# Patient Record
Sex: MALE | Race: BLACK OR AFRICAN AMERICAN | NOT HISPANIC OR LATINO | Employment: STUDENT | URBAN - METROPOLITAN AREA
[De-identification: names, ages, dates, MRNs, and addresses within clinical notes are randomized per-mention and may not be internally consistent; named-entity substitution may affect disease eponyms.]

---

## 2017-01-03 ENCOUNTER — TELEPHONE (OUTPATIENT)
Dept: PEDIATRIC CARDIOLOGY | Facility: CLINIC | Age: 22
End: 2017-01-03

## 2017-01-03 NOTE — TELEPHONE ENCOUNTER
Will review with CC before returning pt's call.    ----- Message from Marylou Noble sent at 1/3/2017  4:29 PM CST -----  Patient calling - he is doing a 21 day fast with his Pentec[employer]alism.  On Tuesday and Thursday they only drink water - no food.  His pelayo wanted him to check with Dr Neal to be sure this was ok before he did it.  He also works out almost everyday.    Patient phone# [phone]    Marylou

## 2017-01-04 ENCOUNTER — TELEPHONE (OUTPATIENT)
Dept: PEDIATRIC CARDIOLOGY | Facility: CLINIC | Age: 22
End: 2017-01-04

## 2017-01-04 NOTE — TELEPHONE ENCOUNTER
Tried returning Sterling's call to give him below info.  No answer and VM box not set up.    ----- Message from CELINA Banuelos,FNP-C sent at 1/3/2017  6:01 PM CST -----  Reviewed with Dr. Neal. Ok to fast, but if ssx of dizziness, he should eat. Also he may benefit from drinking gatorade during the fast days instead of just water.   CC  ----- Message -----     From: Amanda Pool RN     Sent: 1/3/2017   4:51 PM       To: CELINA Banuelos,RAMON-C    Let me know what you think, and I'll give him a call back.    -CR    ----- Message -----     From: Marylou Noble     Sent: 1/3/2017   4:29 PM       To: King [facility]ren Staff    Patient calling - he is doing a 21 day fast with his Judaism.  On Tuesday and Thursday they only drink water - no food.  His pelayo wanted him to check with Dr Neal to be sure this was ok before he did it.  He also works out almost everyday.    Patient phone# [phone]    Marylou

## 2017-01-05 ENCOUNTER — TELEPHONE (OUTPATIENT)
Dept: PEDIATRIC CARDIOLOGY | Facility: CLINIC | Age: 22
End: 2017-01-05

## 2017-01-05 NOTE — TELEPHONE ENCOUNTER
"Heart began fluttering then stopped.  In the middle of walking on the treadmill at gym when it started.  Lasted "not even a minute."  Then it began "beating really heart."  That lasted about 3-5 min.  Took some deep breaths and it stopped.   Severe migraines above left eye.  Last as long as 4 hours.  Right hand and right foot have been going numb as well.  He denies any neuro history or ever having seen a neurologist.  He denies CP or SOB.    Rev'd with CC.  Call us if this happens again, and we will have him come in to have a holter put on.  Important to go about normal workout routine while wearing the holter.  Afterwards, if nothing significant is caught on the monitor, we will get him in with Dr. De Guzman.    Called Sterling back to discuss above instructions.  Instructed him also to see PCP regarding migraines and extremity numbness.  Nothing notes in history/echo/holter to indicate this could be heart-related.  Told him to call back with any updates or questions in the meantime.  Sterling verbalized understanding.  "

## 2017-01-05 NOTE — TELEPHONE ENCOUNTER
----- Message from Qamar De Guzman MD sent at 1/5/2017 10:04 AM CST -----  Contact: Self [phone]  fine to wait  ----- Message -----     From: Chery Ortiz RN     Sent: 1/5/2017   8:58 AM       To: Qamar De Guzman MD    Ok to wait until march? Or squeeze in in January?      ----- Message -----     From: Marco Zambrano     Sent: 1/5/2017   8:27 AM       To: Gab BROWN Staff    Patient would like to be seen at the [place] location with Dr. De Guzman. There are no available appointments to schedule for January at the [place] location with Dr. De Guzman.  Patient states he was advised to return to clinic 3 months after last office visit. Patient is requesting a return phone call.

## 2018-10-10 DIAGNOSIS — R00.2 PALPITATIONS: Primary | ICD-10-CM

## 2018-10-10 DIAGNOSIS — I44.0 FIRST DEGREE HEART BLOCK: ICD-10-CM

## 2018-12-03 ENCOUNTER — CLINICAL SUPPORT (OUTPATIENT)
Dept: PEDIATRIC CARDIOLOGY | Facility: CLINIC | Age: 23
End: 2018-12-03
Attending: NURSE PRACTITIONER
Payer: COMMERCIAL

## 2018-12-03 ENCOUNTER — OFFICE VISIT (OUTPATIENT)
Dept: PEDIATRIC CARDIOLOGY | Facility: CLINIC | Age: 23
End: 2018-12-03
Payer: COMMERCIAL

## 2018-12-03 VITALS
HEIGHT: 71 IN | BODY MASS INDEX: 28.36 KG/M2 | DIASTOLIC BLOOD PRESSURE: 82 MMHG | OXYGEN SATURATION: 100 % | WEIGHT: 202.56 LBS | SYSTOLIC BLOOD PRESSURE: 120 MMHG | HEART RATE: 54 BPM | RESPIRATION RATE: 16 BRPM

## 2018-12-03 DIAGNOSIS — R00.2 PALPITATIONS: ICD-10-CM

## 2018-12-03 DIAGNOSIS — I44.0 FIRST DEGREE HEART BLOCK: ICD-10-CM

## 2018-12-03 DIAGNOSIS — B20 HIV (HUMAN IMMUNODEFICIENCY VIRUS INFECTION): ICD-10-CM

## 2018-12-03 DIAGNOSIS — I51.7 MILD CONCENTRIC LEFT VENTRICULAR HYPERTROPHY (LVH): ICD-10-CM

## 2018-12-03 DIAGNOSIS — R07.9 CHEST PAIN, UNSPECIFIED TYPE: ICD-10-CM

## 2018-12-03 DIAGNOSIS — R55 BLACK-OUT (NOT AMNESIA): ICD-10-CM

## 2018-12-03 PROCEDURE — 3008F BODY MASS INDEX DOCD: CPT | Mod: CPTII,S$GLB,, | Performed by: NURSE PRACTITIONER

## 2018-12-03 PROCEDURE — 99214 OFFICE O/P EST MOD 30 MIN: CPT | Mod: S$GLB,,, | Performed by: NURSE PRACTITIONER

## 2018-12-03 RX ORDER — EMTRICITABINE, RILPIVIRINE HYDROCHLORIDE, AND TENOFOVIR ALAFENAMIDE 200; 25; 25 MG/1; MG/1; MG/1
TABLET ORAL
COMMUNITY
Start: 2018-11-28

## 2018-12-03 NOTE — PATIENT INSTRUCTIONS
Aren Neal MD  Pediatric Cardiology  [address] [place] [address]  [phone]    General Guidelines    Name: Sterling Lopes                   : 1995    Diagnosis:   1. Mild concentric left ventricular hypertrophy (LVH)    2. Palpitations    3. First degree heart block    4. HIV (human immunodeficiency virus infection)    5. Chest pain, unspecified type        PCP: RAMON London  PCP Phone Number: [phone]    · If you have an emergency or you think you have an emergency, go to the nearest emergency room!     · Breathing too fast, doesnt look right, consistently not eating well, your child needs to be checked. These are general indications that your child is not feeling well. This may be caused by anything, a stomach virus, an ear ache or heart disease, so please call RAMON London. If RAMON London thinks you need to be checked for your heart, they will let us know.     · If your child experiences a rapid or very slow heart rate and has the following symptoms, call RAMON London or go to the nearest emergency room.   · unexplained chest pain   · does not look right   · feels like they are going to pass out   · actually passes out for unexplained reasons   · weakness or fatigue   · shortness of breath  or breathing fast   · consistent poor feeding     · If your child experiences a rapid or very slow heart rate that lasts longer than 30 minutes call RAMON London or go to the nearest emergency room.     · If your child feels like they are going to pass out - have them sit down or lay down immediately. Raise the feet above the head (prop the feet on a chair or the wall) until the feeling passes. Slowly allow the child to sit, then stand. If the feeling returns, lay back down and start over.     It is very important that you notify RAMON London first. RAMON London or the ER Physician can reach Dr. Aren Neal at the office or through [facility] at [phone] as needed.    Call our office ([phone]) one week after ALL tests for results.

## 2018-12-03 NOTE — PROGRESS NOTES
"[facility]  Sterling Lopes  1995        HPI  Sterling Lopes has a past medical history of first degree heart block, mild LVH, chest pain, palpitations, dizziness, syncope, HIV (diagnosed last year), and family history of cerebral and aortic aneurysms and family history of cardiomegaly her today again with complaints of sharp chest pain at rest with palpitations and black out spells.  He was originally seen in 2010 for syncopal episode as well as again in 2011 for another syncopal episode after basketball and football games.  It was felt at that time that he was suffering from orthostatic hypotension and was instructed to increase fluid intake.  He also had a EKG at that time that showed a borderline first-degree heart block.  His last visit was in September 2016 where he had complaints of heart racing describes as a flutter and then felt like it slowed down to a very slow heart rate.  He states that his rate would suddenly change.  He was also having some dizzy spells but no syncope. He had an extensive work up including 30 day event monitor without significant abnormalities. He also had a treadmill stress test with CE post which was overall WNL with exception of elevated BP.   Since last visit, Sterling reports that he has had symptoms of sharp chest pain and palpitations at rest. Additionally, he has had episodes where he was on the phone with his friends and he "blacks out" for up to 3 minutes at a time. When he comes to, he is disoriented and confused for a short time and then is okay. He states he will also notice a sharp pain in her left chest that travels down his arm and he holds his chest and takes a deep breath. He will notice his heart racing at those times as well. At times, he also notices that his heart rate seems to slow down to a very slow rate where he almost feels sluggish (he has had similar complaints on previous visits). States he went to [facility] in [place] and " they did an EKG and some labs and discharged him home. Sterling exercises most days of the weak. His exercises consist of running/walking on treadmill with incline at 15 at 4.8mph and intermittently runs for 1-3 min intervals for anywhere from  30-60min at least 3-4 days a week. On other days, he lifts weights which includes bench presses up to 265 lbs and squats over 300 lbs. Sterling is a pescatarian (eats seafood and vegetables and eats no meat). He takes a daily vitamin and denies any workout/enhancement supplements. Denies any recent illness, surgeries, or hospitalizations. Sterling does state that he was found to be positive for HIV last year and is now on triple antiviral therapy. He is managed at [facility] and states his viral load is undetectable.    Past Medical History:   Diagnosis Date    Dizziness     Family history of aneurysm     MGF: abdominal aorta; Maternal uncle: cerebral; Maternal great-aunt: below brain stem    Family history of cardiomegaly     Maternal grandmother's heart is enlarged in the lower part    First degree heart block     borderline    HIV (human immunodeficiency virus infection)     Mild concentric left ventricular hypertrophy (LVH)     Palpitations     heart fluttering     Family History   Problem Relation Age of Onset    Arrhythmia Mother         is on beta blocker    Hypertension Mother     Heart failure Maternal Grandfather      Social History     Socioeconomic History    Marital status: Single     Spouse name: Not on file    Number of children: Not on file    Years of education: Not on file    Highest education level: Not on file   Social Needs    Financial resource strain: Not on file    Food insecurity - worry: Not on file    Food insecurity - inability: Not on file    Transportation needs - medical: Not on file    Transportation needs - non-medical: Not on file   Occupational History    Not on file   Tobacco Use    Smoking status: Not on file   Substance and  Sexual Activity    Alcohol use: Not on file    Drug use: Not on file    Sexual activity: Not on file   Other Topics Concern    Not on file   Social History Narrative    He is studying [employer] at [employer]     Past Surgical History:   Procedure Laterality Date    NO PAST SURGERIES       Birth History    Birth     Weight: 3.6 kg (7 lb 15 oz)       There is no immunization history on file for this patient.  Immunizations were reviewed today and if not current, recommend follow up with the PCP for further management.  Past medical history, family history, surgical history, social history updated and reviewed today.     Allergies: Review of patient's allergies indicates:  No Known Allergies  Current Medications:   Current Outpatient Medications on File Prior to Visit   Medication Sig Dispense Refill    ODEFSEY 200-25-25 mg Tab        No current facility-administered medications on file prior to visit.        Review of Systems   Constitution: Negative for decreased appetite, diaphoresis, fever, weakness, malaise/fatigue, night sweats, weight gain and weight loss.   HENT: Negative for congestion, ear pain, hoarse voice, stridor and tinnitus.    Eyes: Negative for blurred vision, double vision, pain, photophobia, visual disturbance and visual halos.   Cardiovascular: Positive for chest pain and palpitations. Negative for claudication, cyanosis, dyspnea on exertion and near-syncope.        See HPI for description   Respiratory: Negative for cough, shortness of breath, sleep disturbances due to breathing and wheezing.    Endocrine: Negative for cold intolerance, heat intolerance, polydipsia, polyphagia and polyuria.   Hematologic/Lymphatic: Negative for bleeding problem. Does not bruise/bleed easily.   Skin: Negative for color change, flushing, nail changes, rash and suspicious lesions.   Musculoskeletal: Negative for joint pain, joint swelling, muscle cramps, muscle weakness, myalgias, neck pain and  "stiffness.   Gastrointestinal: Negative for bloating, abdominal pain, anorexia, change in bowel habit, constipation, diarrhea, dysphagia, melena, nausea and vomiting.   Genitourinary: Negative for dysuria, flank pain, frequency, nocturia and urgency.   Neurological: Negative for difficulty with concentration, dizziness, focal weakness, headaches, light-headedness, seizures and tremors.        Black out spells/ LOC as described above.   Psychiatric/Behavioral: Negative for memory loss and substance abuse. The patient does not have insomnia.    Allergic/Immunologic: Negative for environmental allergies, hives and persistent infections.       Objective:   Vitals:    12/03/18 1450   BP: 120/82   BP Location: Right arm   Patient Position: Lying   BP Method: Large (Manual)   Pulse: (!) 54   Resp: 16   SpO2: 100%   Weight: 91.9 kg (202 lb 9 oz)   Height: 5' 10.79" (1.798 m)       Physical Exam   Constitutional: Vital signs are normal. He appears well-developed and well-nourished. He is active. He does not appear ill. No distress.   HENT:   Head: Normocephalic and atraumatic.   Nose: Nose normal.   Mouth/Throat: Mucous membranes are not pale, not dry and not cyanotic.   Eyes: Conjunctivae, EOM and lids are normal. Pupils are equal, round, and reactive to light. No scleral icterus.   Neck: Neck supple. Normal carotid pulses and no JVD present. Carotid bruit is not present. No thyroid mass and no thyromegaly present.   Cardiovascular: Normal rate and regular rhythm.  No extrasystoles are present. Exam reveals no gallop, no S3 and no S4.   No murmur heard.  Pulses:       Radial pulses are 2+ on the right side, and 2+ on the left side.   Quiet precordium, regular rate and rhythm, single S1, split S2, normal P2.   No clicks or rumbles.   No cardiomegaly by palpation.      Pulmonary/Chest: Effort normal and breath sounds normal. No respiratory distress. He exhibits no mass and no deformity.   Abdominal: Soft. Normal appearance " and bowel sounds are normal. There is no hepatosplenomegaly. There is no tenderness. No hernia.   Musculoskeletal: Normal range of motion.   Neurological: He is alert. He has normal strength. He is not disoriented.   Skin: Skin is warm and dry. No rash noted. He is not diaphoretic. No cyanosis. No pallor. Nails show no clubbing.   Psychiatric: He has a normal mood and affect.       Tests:   Today's EKG interpretation by Dr. Neal reveals:   Sinus Bradycardia and There is an rsr' pattern in V1 at 54bpm  (Final report in electronic medical record)    Echocardiogram:   Pertinent Echocardiographic findings from the Echo dated 9/9/2016 are:   BSA 2.1 m2.  There are 4 chambers with normally aligned great vessels.  Mild LVH*  First degree HB noted*  RVSP ~ 24 mmHg  Clinical Correlation Suggested  Follow Up Warranted  Review w/ chart**  Chamber sizes are qualitatively normal.  There is good LV function.  There are no shunts noted.  Physiological TR, PI, MR.  The right coronary artery and left coronary are patent by 2D.  (Full report in electronic medical record)    Holter/Event:   Date of Procedure: 09/22/2016  CONCLUSIONS   MCOT  9/22/16-10/24/16  Symptomatic Count 1  Asymptomatic count 30  Symptom episode of skipped beat and light headed showed sinus tachycardia.  Asymptomatic episodes showed Wenckebach during times consistent with sleep, sinus tachycardia, sinus bradycardia with first degree AV block during times consistent with sleep, sinus tachycardia with PAC  No SVT, no VT, no prolonged pauses noted.  This document has been electronically    SIGNED BY: Erik Flood MD On: 10/27/2016 10:17    Treadmill/Stress:   Treadmill stress test results dated 10/24/2016 are:  Exercise time was 15 minutes, Heart rate response to exercise was normal, Stress test was normal for age, No chest pain was reported , No dysrhythmias were noted, No ST segment changes were noted, Stress test was stopped when target heart rate was met and  "Recovery was within normal limits.    Assessment and Plan:  Patient Active Problem List    Diagnosis Date Noted    First degree heart block 09/22/2016    Palpitations 09/22/2016    Dizziness 09/22/2016    Fluttering heart 09/22/2016    Family history of aneurysm 09/22/2016       Dr. Neal reviewed history and physical exam. He then performed the physical exam. He discussed the findings with the patient's caregiver(s), and answered all questions    Chest pain  Chest pain occurs at rest and rarely with activity and he is very active. SB noted on EKG ordered and reviewed today by Dr. Neal and myself and is otherwise without significant abnormality. No STT wave changes. Chest pain likely noncardiac in nature but because of palpitations and history, will order holter and echo as further described below.     Mild concentric left ventricular hypertrophy (LVH)  History of mild LVH on echo in 2016, in view of this and symptoms as well as heavy lifting, will order a 2D echo to re-evaluate extent of LVH as well as overall cardiac structure including aortic root. Sterling has been advised on the dangers of heavy lifting and the strain on the heart as well as aorta with increased risk for aortic dilatation. BP stable for him today. He is not currently on any medications. Further recommendations to follow echo. Sterling will need to continue to be followed at this point from a cardiac standpoint but he is now 23 and in view of other conditions, should be referred to adult cardiology. Recommend his PCP refer him to adult cardiology in [place] for accessibility. Will start work up for now.     Palpitations  Sterling has had some symptoms similar to this in the past but now in view of these reoccuring "black out spells", will need to rule out cardiac arrhythmia. He states they are frequent, almost daily, so will order a 3 day holter. Further recommendations to follow holter results.     First degree heart block  He has had " intermittent 1st degree AV block which is not noted on EKG today. He also had while sleeping along with short episode of Wenckebach on a holter in 2016 felt to be a normal variant. Denies lightheaded or dizzy spells but does report black out spells as described above in HPI. Will order 3 day holter to evaluate for high grade AV block and rule out any ventricular arrhythmia.     Black-out  Episodes are more concerning for a neurological etiology. When he regains conciousness, he is in the same position that he lost it in and tends to be when on the phone with someone when noticed. They are about 3 minutes and then wakes disoriented. Recommend referral to neurologist to rule out seizure activity. Will proceed with cardiac work up to rule out cardiac etiology as above.     Dr. Neal and I have reviewed our general guidelines related to cardiac issues with the family.  I instructed them in the event of an emergency to call 911 or go to the nearest emergency room.  They know to contact the PCP if problems arise or if they are in doubt.    I spent over 35 minutes with the patient. Over 50% of the time was spent counseling the patient and family member      Activity Recommendations:No activity restrictions are indicated at this time. Activities may include endurance training, interscholastic athletic, competition and contact sports.      IE Recommendations: No endocarditis prophylaxis is recommended in this circumstance.      Orders placed this encounter  Orders Placed This Encounter   Procedures    Holter Monitor - 3-14 Day Pediatrics ([facility] Only)     Standing Status:   Future     Number of Occurrences:   1     Standing Expiration Date:   12/3/2019    2D echo with color flow doppler     Standing Status:   Future     Standing Expiration Date:   12/3/2019         Follow-Up:     Follow-up for open appointment for now until referred to adult cardio per PCP.  Recommend referrals to adult cardiologist and  neurology    Sincerely,  Aren Neal MD    Note Contributing Authors:  MD Chery Pérez, FNP-C  12/03/2018    Attestation: Aren Neal MD    I have reviewed the records and agree with the above. I have examined the patient and discussed the findings with the family in attendance. All questions were answered to their satisfaction. I agree with the plan and the follow up instructions.

## 2018-12-03 NOTE — LETTER
December 4, 2018      Eric Lopez, [facility]  [address] [place] [facility] [address]           [facility]  [address] [address]  Phone: [phone]  Fax: [phone]          Patient: Sterling Lopes   MR Number: [id number]   YOB: 1995   Date of Visit: 12/3/2018       Dear Eric Lopez:    Thank you for referring Sterling Lopes to me for evaluation. Attached you will find relevant portions of my assessment and plan of care.    If you have questions, please do not hesitate to call me. I look forward to following Sterling Lopes along with you.    Sincerely,    Chery Perez, NP    Enclosure  CC:  No Recipients    If you would like to receive this communication electronically, please contact [email or web address] or [phone] to request more information on Real Time Translation Link access.    For providers and/or their staff who would like to refer a patient to [facility], please contact us through our one-stop-shop provider referral line, [facility], at [phone].    If you feel you have received this communication in error or would no longer like to receive these types of communications, please e-mail [email or web address]

## 2018-12-04 PROBLEM — I51.7 MILD CONCENTRIC LEFT VENTRICULAR HYPERTROPHY (LVH): Status: ACTIVE | Noted: 2018-12-04

## 2018-12-04 PROBLEM — R55 BLACK-OUT (NOT AMNESIA): Status: ACTIVE | Noted: 2018-12-04

## 2018-12-04 PROBLEM — R07.9 CHEST PAIN: Status: ACTIVE | Noted: 2018-12-04

## 2018-12-04 NOTE — ASSESSMENT & PLAN NOTE
He has had intermittent 1st degree AV block which is not noted on EKG today. He also had while sleeping along with short episode of Wenckebach on a holter in 2016 felt to be a normal variant. Denies lightheaded or dizzy spells but does report black out spells as described above in HPI. Will order 3 day holter to evaluate for high grade AV block and rule out any ventricular arrhythmia.

## 2018-12-04 NOTE — ASSESSMENT & PLAN NOTE
Chest pain occurs at rest and rarely with activity and he is very active. SB noted on EKG ordered and reviewed today by Dr. Neal and myself and is otherwise without significant abnormality. No STT wave changes. Chest pain likely noncardiac in nature but because of palpitations and history, will order holter and echo as further described below.

## 2018-12-04 NOTE — ASSESSMENT & PLAN NOTE
"Sterling has had some symptoms similar to this in the past but now in view of these reoccuring "black out spells", will need to rule out cardiac arrhythmia. He states they are frequent, almost daily, so will order a 3 day holter. Further recommendations to follow holter results.   "

## 2018-12-04 NOTE — ASSESSMENT & PLAN NOTE
Episodes are more concerning for a neurological etiology. When he regains conciousness, he is in the same position that he lost it in and tends to be when on the phone with someone when noticed. They are about 3 minutes and then wakes disoriented. Recommend referral to neurologist to rule out seizure activity. Will proceed with cardiac work up to rule out cardiac etiology as above.

## 2018-12-04 NOTE — ASSESSMENT & PLAN NOTE
History of mild LVH on echo in 2016, in view of this and symptoms as well as heavy lifting, will order a 2D echo to re-evaluate extent of LVH as well as overall cardiac structure including aortic root. Sterling has been advised on the dangers of heavy lifting and the strain on the heart as well as aorta with increased risk for aortic dilatation. BP stable for him today. He is not currently on any medications. Further recommendations to follow echo. Sterling will need to continue to be followed at this point from a cardiac standpoint but he is now 23 and in view of other conditions, should be referred to adult cardiology. Recommend his PCP refer him to adult cardiology in [place] for accessibility. Will start work up for now.

## 2018-12-13 ENCOUNTER — TELEPHONE (OUTPATIENT)
Dept: PEDIATRIC CARDIOLOGY | Facility: CLINIC | Age: 23
End: 2018-12-13

## 2018-12-13 NOTE — TELEPHONE ENCOUNTER
Tried to call Sterling back but no answer/no VM set up. Reviewed chart- no labs ordered by [facility]. Holter and Echo ordered. Holter results not back yet (takes about 10-14 days from day it was placed in the mail). No echo appt ever scheduled- will get scheduled once he calls back.

## 2018-12-13 NOTE — TELEPHONE ENCOUNTER
----- Message from Makeda Spears MA sent at 12/13/2018  9:38 AM CST -----  Regarding: Patient Calling  Contact: [phone]  Patient would like a call back with lab results at [phone] .  Thank you!!

## 2018-12-14 LAB
OHS CV EVENT MONITOR DAY: 3
OHS CV HOLTER LENGTH DECIMAL HOURS: 74
OHS CV HOLTER LENGTH HOURS: 2
OHS CV HOLTER LENGTH MINUTES: 0

## 2018-12-18 ENCOUNTER — TELEPHONE (OUTPATIENT)
Dept: PEDIATRIC CARDIOLOGY | Facility: CLINIC | Age: 23
End: 2018-12-18

## 2018-12-18 ENCOUNTER — CLINICAL SUPPORT (OUTPATIENT)
Dept: PEDIATRIC CARDIOLOGY | Facility: CLINIC | Age: 23
End: 2018-12-18
Attending: NURSE PRACTITIONER
Payer: COMMERCIAL

## 2018-12-18 DIAGNOSIS — B20 HIV (HUMAN IMMUNODEFICIENCY VIRUS INFECTION): ICD-10-CM

## 2018-12-18 DIAGNOSIS — I44.0 FIRST DEGREE HEART BLOCK: ICD-10-CM

## 2018-12-18 DIAGNOSIS — R00.2 PALPITATIONS: ICD-10-CM

## 2018-12-18 DIAGNOSIS — B20 HIV INFECTION: ICD-10-CM

## 2018-12-18 DIAGNOSIS — R00.2 PALPITATIONS: Primary | ICD-10-CM

## 2018-12-18 DIAGNOSIS — R07.9 CHEST PAIN, UNSPECIFIED TYPE: ICD-10-CM

## 2018-12-18 DIAGNOSIS — I51.7 MILD CONCENTRIC LEFT VENTRICULAR HYPERTROPHY (LVH): ICD-10-CM

## 2018-12-18 NOTE — TELEPHONE ENCOUNTER
"Patient in office for echo requested holter. Results. Reviewed with patient.    Sinus rhythm  Rare PACs/PVCs      Occasional Wenckebach during presumed sleep   Performing Clinician     Renan Magdaleno   Vitals     Height Weight BMI (Calculated) BSA (Calculated - sq m) BP   5' 10.79" (1.798 m) 91.9 kg (202 lb 9 oz) 28.5 2.14 sq meters 120/82    Reason For Exam   Priority: Routine   Dx: Palpitations [R00.2 (ICD-10-CM)]; First degree heart block [I44.0 (ICD-10-CM)]; HIV (human immunodeficiency virus infection) [B20 (ICD-10-CM)]; Mild concentric left ventricular hypertrophy (LVH) [I51.7 (ICD-10-CM)]; Chest pain, unspecified type [R07.9 (ICD-10-CM)]         PACS Images for [employer] View[employer]      Show images for Holter Monitor - 3-14 Day Pediatrics (Cupid Only)   Result Image Hyperlink      Show images for Holter Monitor - 3-14 Day Pediatrics (Cupid Only)   Order-Level Epiphany Scans:     There are no order-level epiphany scans.   Holter Monitor - Diary     The diary was properly completed.   There were occasional hookup related artifacts. Overall, the study was of good quality. The tape was adequate (3 days , 2 hours, 0 minutes).       There was an episode of Chest pain/pressure reported. The corresponding rhythm strips revealed the following:   During the event (woke up ), the rhythm was sinus rhythm at 59 bpm with without ectopy.     There was an episode of Chest pain/pressure reported. The corresponding rhythm strips revealed the following:   During the event (walking), the rhythm was sinus rhythm at 94 bpm with without ectopy.   Holter Monitor - Rhythm     Predominant Rhythm  Sinus rhythm with heart rates varying between 27 and 189 bpm with an average of 78 bpm.     Maximum heart rate recorded at: 19:41 on day 2.     Minimum heart rate recorded at 08:23 on day 3.   Holter Monitor - PVC     Ventricular Arrhythmias  There were very rare PVCs totalling 23 and averaging 0.31 per hour.   Holter Monitor - PAC "     Supraventricular Arrhythmias  There were very rare PACs totalling 24 and averaging 0.32 per hour.   Holter Monitor - SA/AV     Occasional episodes of Wenckebach noted

## 2019-01-02 ENCOUNTER — TELEPHONE (OUTPATIENT)
Dept: PEDIATRIC CARDIOLOGY | Facility: CLINIC | Age: 24
End: 2019-01-02

## 2019-01-02 DIAGNOSIS — I44.0 FIRST DEGREE HEART BLOCK: ICD-10-CM

## 2019-01-02 DIAGNOSIS — B20 HIV (HUMAN IMMUNODEFICIENCY VIRUS INFECTION): ICD-10-CM

## 2019-01-02 DIAGNOSIS — I44.1 MOBITZ TYPE I WENCKEBACH ATRIOVENTRICULAR BLOCK: ICD-10-CM

## 2019-01-02 DIAGNOSIS — R00.2 PALPITATIONS: ICD-10-CM

## 2019-01-02 DIAGNOSIS — I51.7 MILD CONCENTRIC LEFT VENTRICULAR HYPERTROPHY (LVH): ICD-10-CM

## 2019-01-02 NOTE — TELEPHONE ENCOUNTER
Spoke to patient after reviewing with Dr. Neal and informed him of holter and echo results.Explained to him that he does have several episodes of Wenckebach which is presumed to occur while sleeping (one was at 9:30am). Episodes of chest discomfort show NSR with rates of 59 and 94. Slowest heart rate was documented at 27 but I cannot find strip for review. Dr. Neal feels it may be calculated from one of the strips with Wenckeback were the R-R interval is quite elongated but does appear less than 3 seconds. Will continue to monitor. Recommend that he sees neurology in view of black out spells and will need to see PCP for referral. Additionally on echo he still has mild LVH with normal LVEF, but in view of history, Dr. Neal would like him to be followed by cardiology. In view of his age, will refer to adult cardiologist, in [place], per patient's preference.